# Patient Record
Sex: FEMALE | Race: WHITE | NOT HISPANIC OR LATINO | Employment: UNEMPLOYED | URBAN - METROPOLITAN AREA
[De-identification: names, ages, dates, MRNs, and addresses within clinical notes are randomized per-mention and may not be internally consistent; named-entity substitution may affect disease eponyms.]

---

## 2017-04-28 ENCOUNTER — TRANSCRIBE ORDERS (OUTPATIENT)
Dept: ADMINISTRATIVE | Facility: HOSPITAL | Age: 52
End: 2017-04-28

## 2017-04-28 DIAGNOSIS — Z12.31 VISIT FOR SCREENING MAMMOGRAM: Primary | ICD-10-CM

## 2017-05-09 ENCOUNTER — TRANSCRIBE ORDERS (OUTPATIENT)
Dept: ADMINISTRATIVE | Facility: HOSPITAL | Age: 52
End: 2017-05-09

## 2017-05-09 ENCOUNTER — APPOINTMENT (OUTPATIENT)
Dept: MAMMOGRAPHY | Facility: HOSPITAL | Age: 52
End: 2017-05-09
Attending: OBSTETRICS & GYNECOLOGY

## 2017-05-09 DIAGNOSIS — Z12.31 VISIT FOR SCREENING MAMMOGRAM: Primary | ICD-10-CM

## 2017-05-10 ENCOUNTER — HOSPITAL ENCOUNTER (OUTPATIENT)
Dept: MAMMOGRAPHY | Facility: HOSPITAL | Age: 52
Discharge: HOME | End: 2017-05-10
Attending: OBSTETRICS & GYNECOLOGY | Admitting: OBSTETRICS & GYNECOLOGY

## 2017-05-10 DIAGNOSIS — Z12.31 VISIT FOR SCREENING MAMMOGRAM: ICD-10-CM

## 2017-05-10 PROCEDURE — G0202 SCR MAMMO BI INCL CAD: HCPCS

## 2017-05-10 PROCEDURE — 77063 BREAST TOMOSYNTHESIS BI: CPT

## 2021-01-13 ENCOUNTER — TELEPHONE (OUTPATIENT)
Dept: FAMILY MEDICINE CLINIC | Facility: CLINIC | Age: 56
End: 2021-01-13

## 2021-01-13 NOTE — TELEPHONE ENCOUNTER
FILEMON WAS REFERRED TO DR. ESQUIVEL BY A CURRENT PATIENT JOSE HERNANDEZ, AND SHE WAS HOPING THAT DR. ESQUIVEL MIGHT BE ABLE TO WORK HER IN AS A NEW PATIENT. IF NOT, SHE WOULD LIKE TO KNOW WHO DR. ESQUIVEL WOULD REFER HER TO.    PLEASE ADVISE  [phone]

## 2021-01-27 ENCOUNTER — OFFICE (OUTPATIENT)
Dept: URBAN - METROPOLITAN AREA CLINIC 64 | Facility: CLINIC | Age: 56
End: 2021-01-27

## 2021-01-27 VITALS
HEART RATE: 102 BPM | WEIGHT: 140 LBS | SYSTOLIC BLOOD PRESSURE: 116 MMHG | DIASTOLIC BLOOD PRESSURE: 83 MMHG | HEIGHT: 64 IN

## 2021-01-27 DIAGNOSIS — Z12.11 ENCOUNTER FOR SCREENING FOR MALIGNANT NEOPLASM OF COLON: ICD-10-CM

## 2021-01-27 DIAGNOSIS — K30 FUNCTIONAL DYSPEPSIA: ICD-10-CM

## 2021-01-27 DIAGNOSIS — R10.13 EPIGASTRIC PAIN: ICD-10-CM

## 2021-01-27 DIAGNOSIS — F41.9 ANXIETY DISORDER, UNSPECIFIED: ICD-10-CM

## 2021-01-27 DIAGNOSIS — R12 HEARTBURN: ICD-10-CM

## 2021-01-27 PROCEDURE — 99203 OFFICE O/P NEW LOW 30 MIN: CPT | Performed by: INTERNAL MEDICINE
